# Patient Record
Sex: MALE | Race: WHITE | URBAN - METROPOLITAN AREA
[De-identification: names, ages, dates, MRNs, and addresses within clinical notes are randomized per-mention and may not be internally consistent; named-entity substitution may affect disease eponyms.]

---

## 2018-10-09 ENCOUNTER — HISTORICAL (OUTPATIENT)
Dept: ADMINISTRATIVE | Facility: HOSPITAL | Age: 63
End: 2018-10-09

## 2018-12-04 ENCOUNTER — HISTORICAL (OUTPATIENT)
Dept: ADMINISTRATIVE | Facility: HOSPITAL | Age: 63
End: 2018-12-04

## 2022-04-30 NOTE — OP NOTE
DATE OF SURGERY:  12-4-18    SURGEON:  Marissa Cee MD    PREOPERATIVE DIAGNOSIS:  Nucleosclerotic cataract of the left eye.    POST OPERATIVE DIAGNOSIS:  Nucleosclerotic cataract of the left eye.    PROCEDURE:  Cataract extraction with intraocular lens implantation of the left eye with history of RK    ANESTHESIA:  Local plus MAC.    COMPLICATIONS:  None.    ESTIMATED BLOOD LOSS:  None.     After discussion of risks, benefits and alternative with the patient and family, informed consent was obtained by the patient in clinic including but not limited to bleeding, infection, decreased vision, loss of the eye, need for subsequent surgery.  The patient understands and wishes to proceed.    PROCEDURE IN DETAIL:    The patient was brought into the operating room and laid in supine manner.  After anesthesia was undertaken without complication, the operative eye and periorbital region were prepped and draped in usual manner for intraocular surgery while a speculum was placed in the operative eye for adequate exposure. A paracentesis incision was made at approximately 4 o'clock with a clear cornea at the limbus. Preservative free Lidocaine and epinephrine was injected into the anterior chamber followed by viscoelastic.  A triplanar cataract wound was then created approximately 2  o'clock through clear cornea at the limbus.  Curvilinear capsulorrhexis was created without complication.     BSS on a cannula was used to hydrodissect the lens.  The lens was found to move freely within the capsular bag.  Lens was then removed in divide and conquer technique.  Remaining cortical material was removed with I&A handpiece. At that time there appears to be a small rent in the posterior capsule. A 21.5 diopter MA60 lens was then implanted into the sulcus. The remaining viscoelastic was then removed with the irrigation aspiration handpiece. The wounds were hydrated andpostop injections were given.  The patient tolerated the  procedure well.  Eyelid speculum was removed followed by pressure patch and shield.  The patient was turned over to anesthesia in stable condition.

## 2022-04-30 NOTE — OP NOTE
DATE OF SURGERY:    10-9-18    SURGEON:  Marissa Cee MD    PREOPERATIVE DIAGNOSIS:  Nucleosclerotic cataract of the right eye with previous history of RK    POST OPERATIVE DIAGNOSIS:  Nucleosclerotic cataract of the left eye.    PROCEDURE:  Cataract extraction with intraocular lens implantation of the right eye.    ANESTHESIA:  Local plus MAC.    COMPLICATIONS:  None.    ESTIMATED BLOOD LOSS:  None.     After discussion of risks, benefits and alternative with the patient and family, informed consent was obtained by the patient in clinic including but not limited to bleeding, infection, decreased vision, loss of the eye, need for subsequent surgery.  The patient understands and wishes to proceed.    PROCEDURE IN DETAIL:    The patient was brought into the operating room and laid in supine manner.  After anesthesia was undertaken without complication, the operative eye and periorbital region were prepped and draped in usual manner for intraocular surgery while a speculum was placed in the operative eye for adequate exposure. A paracentesis incision was made at approximately 11 o'clock with a clear cornea at the limbus. Preservative free Lidocaine and epinephrine was injected into the anterior chamber followed by viscoelastic.  A triplanar cataract wound was then created approximately 7  o'clock through clear cornea at the limbus.  Curvilinear capsulorrhexis was created without complication.     BSS sonic cannula was used to hydrodissect the lens.  The lens was found to move freely within the capsular bag.  Lens was then removed in divide and conquer technique.  Remaining cortical material was removed with I&A handpiece. A 21.0 diopter ZCBOO lens was then implanted into the capsular bag. The remaining viscoelastic was then removed with the irrigation aspiration handpiece. The wounds were hydrated andpostop injections were given.  The patient tolerated the procedure well.  Eyelid speculum was removed followed  by pressure patch and shield.  The patient was turned over to anesthesia in stable condition.